# Patient Record
Sex: MALE | NOT HISPANIC OR LATINO | Employment: FULL TIME | URBAN - METROPOLITAN AREA
[De-identification: names, ages, dates, MRNs, and addresses within clinical notes are randomized per-mention and may not be internally consistent; named-entity substitution may affect disease eponyms.]

---

## 2019-12-12 RX ORDER — TAMSULOSIN HYDROCHLORIDE 0.4 MG/1
0.4 CAPSULE ORAL DAILY
Qty: 30 CAPSULE | Refills: 2 | Status: SHIPPED | OUTPATIENT
Start: 2019-12-12 | End: 2019-12-27 | Stop reason: SDUPTHER

## 2019-12-12 NOTE — TELEPHONE ENCOUNTER
----- Message from Alejandro Tan sent at 12/12/2019  8:32 AM CST -----  Contact: Pt  Type:  RX Refill Request    Who Called: Elton  Refill or New Rx:Refill  RX Name and Strength:Tamsulosin .4mg  How is the patient currently taking it? (ex. 1XDay):1xday  Is this a 30 day or 90 day RX:30  Preferred Pharmacy with phone number: [facility] in [place], [place]  Local or Mail Order:Local  Ordering Provider:Dr. Christine  Would the patient rather a call back or a response via [facility]? Call back  Best Call Back Number:[phone] (home)  Additional Information: n/a

## 2019-12-27 RX ORDER — TAMSULOSIN HYDROCHLORIDE 0.4 MG/1
0.4 CAPSULE ORAL DAILY
Qty: 30 CAPSULE | Refills: 2 | Status: SHIPPED | OUTPATIENT
Start: 2019-12-27 | End: 2020-03-02

## 2019-12-27 NOTE — TELEPHONE ENCOUNTER
----- Message from Benita Hugo sent at 12/26/2019  3:39 PM CST -----  Contact: Patient   .Type:  RX Refill Request    Who Called:  Patient   Refill or New Rx: refill   RX Name and Strength: ivone max   How is the patient currently taking it? (ex. 1XDay): 1x daily   Is this a 30 day or 90 day RX:  30  Preferred Pharmacy with phone number:   [id number] - [place], [place] - [address] [place] [address] [address]  Phone: [phone] Fax: [phone]      Local or Mail Order: local   Ordering Provider: Kevin Christine   Would the patient rather a call back or a response via [facility]? call  Best Call Back Number: [phone]  Additional Information: Patient stated this is his 2nd message and Never  received his medication  or call back from the nurse .

## 2020-01-10 ENCOUNTER — OFFICE VISIT (OUTPATIENT)
Dept: UROLOGY | Facility: CLINIC | Age: 60
End: 2020-01-10
Payer: COMMERCIAL

## 2020-01-10 VITALS
HEIGHT: 71 IN | WEIGHT: 260 LBS | HEART RATE: 77 BPM | SYSTOLIC BLOOD PRESSURE: 149 MMHG | RESPIRATION RATE: 18 BRPM | DIASTOLIC BLOOD PRESSURE: 78 MMHG | BODY MASS INDEX: 36.4 KG/M2

## 2020-01-10 DIAGNOSIS — C61 PROSTATE CANCER: Primary | ICD-10-CM

## 2020-01-10 DIAGNOSIS — N40.1 BPH WITH URINARY OBSTRUCTION: ICD-10-CM

## 2020-01-10 DIAGNOSIS — N13.8 BPH WITH URINARY OBSTRUCTION: ICD-10-CM

## 2020-01-10 LAB
BILIRUB UR QL STRIP: NEGATIVE
GLUCOSE UR QL STRIP: NEGATIVE
KETONES UR QL STRIP: NEGATIVE
LEUKOCYTE ESTERASE UR QL STRIP: NEGATIVE
PH, POC UA: 6.5
POC AMORP, URINE: 0
POC BACTI, URINE: 0
POC BLOOD, URINE: NEGATIVE
POC CASTS, URINE: 0
POC CRYST, URINE: 0
POC EPITH, URINE: 0
POC HCG, URINE: ABNORMAL
POC HYALIN, URINE: 0 LPF
POC MUCUS, URINE: 0
POC NITRATES, URINE: NEGATIVE
POC OTHER, URINE: 0
POC RBC, URINE: 0 HPF
POC WBC, URINE: 0 HPF
PROT UR QL STRIP: NEGATIVE
PSA, DIAGNOSTIC: 9.37 NG/ML (ref 0.1–4)
SP GR UR STRIP: 1.01 (ref 1–1.03)
UROBILINOGEN UR STRIP-ACNC: 0.2 (ref 0.3–2.2)

## 2020-01-10 PROCEDURE — 99213 OFFICE O/P EST LOW 20 MIN: CPT | Mod: S$GLB,,, | Performed by: SPECIALIST

## 2020-01-10 PROCEDURE — 99213 PR OFFICE/OUTPT VISIT, EST, LEVL III, 20-29 MIN: ICD-10-PCS | Mod: S$GLB,,, | Performed by: SPECIALIST

## 2020-01-10 NOTE — PROGRESS NOTES
Subjective:       Patient ID: Elton Monge is a 59 y.o. male.    Chief Complaint: Prostate Cancer (6mth f/u//psa due///wants to discuss poss prostatectomy)      HPI: HPI  59-year-old man who has a history of prostate cancer.  He is currently on active surveillance.  He had undergone an initial biopsy of the prostate on April 16, 2015 for a PSA that was obtain a few months prior to that of 5.3 ng/mL.    Sometime in January of 2006 he had a repeat PSA and went up to 8.5 ng/mL.  He underwent another biopsy in February 10, 2016 which showed Rashad 3 + 3 equals 6 in 1 core 0.3 mm focus 3% of the core.  He also had atypical small acinar proliferation in 1 core.  Since then he has been on active surveillance.    Patient also has symptomatic BPH he takes Flomax daily and his symptoms are not adequately controlled.    He is presenting today wanting to consider active treatment for his prostate cancer.  He does want to remain on active surveillance as this is somehow affecting his psyche.      Past Medical History:   Past Medical History:   Diagnosis Date    Prostate cancer        Past Surgical Historical:   Past Surgical History:   Procedure Laterality Date    ANKLE FRACTURE SURGERY Left     CARPAL TUNNEL RELEASE Left     remove of cyst of back of head          Medications:   Medication List with Changes/Refills   Current Medications    TAMSULOSIN (FLOMAX) 0.4 MG CAP    Take 1 capsule (0.4 mg total) by mouth once daily.        Past Social History:   Social History     Socioeconomic History    Marital status: Single     Spouse name: Not on file    Number of children: Not on file    Years of education: Not on file    Highest education level: Not on file   Occupational History    Not on file   Social Needs    Financial resource strain: Not on file    Food insecurity:     Worry: Not on file     Inability: Not on file    Transportation needs:     Medical: Not on file     Non-medical: Not on file   Tobacco Use     Smoking status: Current Every Day Smoker     Packs/day: 0.50     Types: Cigarettes    Smokeless tobacco: Former User   Substance and Sexual Activity    Alcohol use: Yes     Comment: very rarely    Drug use: Never    Sexual activity: Not on file   Lifestyle    Physical activity:     Days per week: Not on file     Minutes per session: Not on file    Stress: Not on file   Relationships    Social connections:     Talks on phone: Not on file     Gets together: Not on file     Attends Judaism service: Not on file     Active member of club or organization: Not on file     Attends meetings of clubs or organizations: Not on file     Relationship status: Not on file   Other Topics Concern    Not on file   Social History Narrative    Not on file       Allergies: Review of patient's allergies indicates:  No Known Allergies     Family History:   Family History   Problem Relation Age of Onset    Cancer Father     Stomach cancer Father     Heart disease Father     Diabetes Mother     Heart disease Mother     Kidney disease Mother         Review of Systems:   systems reviewed and notable for prostate cancer  All other systems were reviewed Neg except as stated in the HPI    Physical Exam:  AGeneral: A&Ox3. No apparent distress. No deformities.  Neck: No masses. Normal thyroid.  Lungs: normal inspiration. No use of accessory muscles.  Heart: normal pulse. No arrhythmias.  Abdomen: Soft. NT. ND. No masses. No hernias. No hepatosplenomegaly.  Lymphatic: Neck and groin nodes negative.  Skin: The skin is warm and dry. No jaundice.  Neurology: Cranial nerves 2-12 crossly intact, no focal weaknesses, no sensation deficits, no motor deficits  Ext: No clubbing, cyanosis or edema.  :  Deferred      Assessment/Plan:       59-year-old man who has prostate cancer was been on active surveillance to date.    1.  Talked about treatment modalities with prostate cancer ranging from continued active surveillance to radiotherapy  as well as robotic assisted radical prostatectomy.  Because patient is young and healthy and because he experiences a lot of lower tract symptoms not well controlled on Flomax at the will be a good candidate for a robotic prostatectomy.  As of now with leaning to was 22nd January 2020 for his prostatectomy.    The details of the procedure were discussed with this patient perioperative expectations with set we make mention that he will have to keep the catheter for at least 7-10 days following the procedure.  He will need some time off from work. We talked about possible complications of this procedure talked about the possible side effects including problems with erections as well as incontinence.  He has agreed to proceed.    Problem List Items Addressed This Visit        Renal/    BPH with urinary obstruction       Oncology    Prostate cancer - Primary

## 2020-01-20 ENCOUNTER — CLINICAL SUPPORT (OUTPATIENT)
Dept: UROLOGY | Facility: CLINIC | Age: 60
End: 2020-01-20
Payer: COMMERCIAL

## 2020-01-20 DIAGNOSIS — C61 PROSTATE CANCER: Primary | ICD-10-CM

## 2020-01-22 ENCOUNTER — OUTSIDE PLACE OF SERVICE (OUTPATIENT)
Dept: UROLOGY | Facility: CLINIC | Age: 60
End: 2020-01-22
Payer: COMMERCIAL

## 2020-01-22 PROCEDURE — 55866 PR LAP,PROSTATECTOMY,RADICAL,W/NERVE SPARE,INCL ROBOTIC: ICD-10-PCS | Mod: ,,, | Performed by: SPECIALIST

## 2020-01-22 PROCEDURE — 55866 LAPS SURG PRST8ECT RPBIC RAD: CPT | Mod: ,,, | Performed by: SPECIALIST

## 2020-01-22 PROCEDURE — 38571 PR LAP,PELVIC LYMPHADENECTOMY: ICD-10-PCS | Mod: 51,,, | Performed by: SPECIALIST

## 2020-01-22 PROCEDURE — 38571 LAPAROSCOPY LYMPHADENECTOMY: CPT | Mod: 51,,, | Performed by: SPECIALIST

## 2020-01-23 LAB
BUN SERPL-MCNC: 11 MG/DL (ref 7–18)
BUN/CREAT SERPL: 13.09 RATIO (ref 7–18)
CALCIUM SERPL-MCNC: 8.2 MG/DL (ref 8.8–10.5)
CHLORIDE SERPL-SCNC: 104 MMOL/L (ref 100–108)
CO2 SERPL-SCNC: 28 MMOL/L (ref 21–32)
CREAT SERPL-MCNC: 0.84 MG/DL (ref 0.7–1.3)
ERYTHROCYTE [DISTWIDTH] IN BLOOD BY AUTOMATED COUNT: 12.9 % (ref 12.5–18)
GFR ESTIMATION: > 60
GLUCOSE SERPL-MCNC: 118 MG/DL (ref 70–110)
HCT VFR BLD AUTO: 42.4 % (ref 42–52)
HGB BLD-MCNC: 14.1 G/DL (ref 14–18)
MANUAL NRBC PER 100 CELLS: 0 %
MCH RBC QN AUTO: 30.7 PG (ref 27–31.2)
MCHC RBC AUTO-ENTMCNC: 33.3 G/DL (ref 31.8–35.4)
MCV RBC AUTO: 92.2 FL (ref 80–97)
PLATELETS: 357 10*3/UL (ref 142–424)
POTASSIUM SERPL-SCNC: 3.8 MMOL/L (ref 3.6–5.2)
RBC # BLD AUTO: 4.6 10*6/UL (ref 4.7–6.1)
SODIUM BLD-SCNC: 139 MMOL/L (ref 135–145)
WBC # BLD: 10.6 10*3/UL (ref 4.6–10.2)

## 2020-01-24 ENCOUNTER — TELEPHONE (OUTPATIENT)
Dept: UROLOGY | Facility: CLINIC | Age: 60
End: 2020-01-24

## 2020-01-24 NOTE — TELEPHONE ENCOUNTER
Patient called clinic back to schedule appointment. Appointment scheduled for 02/03/2020 for cystogram. Patient verbalizes understanding.

## 2020-01-24 NOTE — TELEPHONE ENCOUNTER
Called to speak to patient in regards to setting up appointment for Cystogram with Eduard on 02/03/20. No answer. LVM.

## 2020-01-28 ENCOUNTER — TELEPHONE (OUTPATIENT)
Dept: UROLOGY | Facility: CLINIC | Age: 60
End: 2020-01-28

## 2020-01-28 NOTE — TELEPHONE ENCOUNTER
----- Message from Jayne Honeycutt sent at 1/27/2020  9:45 AM CST -----  Contact: pt   Pt calling to follow up on paperwork that was drop off. Can you please call patient to give update on paperwork. [phone] (home) [phone] (work)          Thanks  Jayne Honeycutt

## 2020-02-03 ENCOUNTER — HOSPITAL ENCOUNTER (OUTPATIENT)
Dept: RADIOLOGY | Facility: CLINIC | Age: 60
Discharge: HOME | End: 2020-02-03
Attending: SPECIALIST
Payer: COMMERCIAL

## 2020-02-03 ENCOUNTER — OFFICE VISIT (OUTPATIENT)
Dept: UROLOGY | Facility: CLINIC | Age: 60
End: 2020-02-03
Payer: COMMERCIAL

## 2020-02-03 VITALS
RESPIRATION RATE: 12 BRPM | HEIGHT: 71 IN | DIASTOLIC BLOOD PRESSURE: 84 MMHG | WEIGHT: 252 LBS | SYSTOLIC BLOOD PRESSURE: 124 MMHG | BODY MASS INDEX: 35.28 KG/M2 | HEART RATE: 80 BPM

## 2020-02-03 DIAGNOSIS — C61 PROSTATE CANCER: ICD-10-CM

## 2020-02-03 DIAGNOSIS — Z90.79 STATUS POST PROSTATECTOMY: ICD-10-CM

## 2020-02-03 DIAGNOSIS — N40.1 BPH WITH URINARY OBSTRUCTION: ICD-10-CM

## 2020-02-03 DIAGNOSIS — N13.8 BPH WITH URINARY OBSTRUCTION: Primary | ICD-10-CM

## 2020-02-03 DIAGNOSIS — N13.8 BPH WITH URINARY OBSTRUCTION: ICD-10-CM

## 2020-02-03 DIAGNOSIS — N40.1 BPH WITH URINARY OBSTRUCTION: Primary | ICD-10-CM

## 2020-02-03 PROCEDURE — 99024 PR POST-OP FOLLOW-UP VISIT: ICD-10-PCS | Mod: S$GLB,,, | Performed by: SPECIALIST

## 2020-02-03 PROCEDURE — 99024 POSTOP FOLLOW-UP VISIT: CPT | Mod: S$GLB,,, | Performed by: SPECIALIST

## 2020-02-03 RX ORDER — HYDROCODONE BITARTRATE AND ACETAMINOPHEN 5; 325 MG/1; MG/1
TABLET ORAL
COMMUNITY
Start: 2020-01-24 | End: 2020-03-02

## 2020-02-03 RX ORDER — HYDROCHLOROTHIAZIDE 12.5 MG/1
CAPSULE ORAL
COMMUNITY
Start: 2020-01-20

## 2020-02-03 RX ORDER — SULFAMETHOXAZOLE AND TRIMETHOPRIM 800; 160 MG/1; MG/1
TABLET ORAL
COMMUNITY
Start: 2020-01-24 | End: 2020-03-02

## 2020-02-03 RX ORDER — HYOSCYAMINE SULFATE 0.12 MG/1
TABLET, ORALLY DISINTEGRATING ORAL
COMMUNITY
Start: 2020-01-24 | End: 2020-03-02

## 2020-02-03 NOTE — PROGRESS NOTES
Subjective:       Patient ID: Elton Monge is a 59 y.o. male.    Chief Complaint: Prostate Cancer (cystogram/horton dc/path report)      HPI:  59-year-old man with prostate cancer underwent robotic prostatectomy almost 2 weeks ago.  He is here today for consideration for Horton catheter removal.    I discussed his pathology with him today he has no adverse pathological features.  Lymph nodes were all negative.  Primary histologic grade was 3+ 3 equal 6 seminal vesicle invasion was not identified margins were negative.  All in all he has pathological stage of pT2 pN0 adenocarcinoma of the prostate.    Past Medical History:   Past Medical History:   Diagnosis Date    Hypertension     Prostate cancer        Past Surgical Historical:   Past Surgical History:   Procedure Laterality Date    ANKLE FRACTURE SURGERY Left     CARPAL TUNNEL RELEASE Left     PROSTATE SURGERY      remove of cyst of back of head          Medications:   Medication List with Changes/Refills   Current Medications    HYDROCHLOROTHIAZIDE (MICROZIDE) 12.5 MG CAPSULE        HYDROCODONE-ACETAMINOPHEN (NORCO) 5-325 MG PER TABLET        LEVSIN/SL 0.125 MG SUBL        SULFAMETHOXAZOLE-TRIMETHOPRIM 800-160MG (BACTRIM DS) 800-160 MG TAB        TAMSULOSIN (FLOMAX) 0.4 MG CAP    Take 1 capsule (0.4 mg total) by mouth once daily.        Past Social History:   Social History     Socioeconomic History    Marital status: Single     Spouse name: Not on file    Number of children: Not on file    Years of education: Not on file    Highest education level: Not on file   Occupational History    Not on file   Social Needs    Financial resource strain: Not on file    Food insecurity:     Worry: Not on file     Inability: Not on file    Transportation needs:     Medical: Not on file     Non-medical: Not on file   Tobacco Use    Smoking status: Current Every Day Smoker     Packs/day: 0.50     Types: Cigarettes    Smokeless tobacco: Former User   Substance and  Sexual Activity    Alcohol use: Yes     Comment: very rarely    Drug use: Never    Sexual activity: Not on file   Lifestyle    Physical activity:     Days per week: Not on file     Minutes per session: Not on file    Stress: Not on file   Relationships    Social connections:     Talks on phone: Not on file     Gets together: Not on file     Attends Sikhism service: Not on file     Active member of club or organization: Not on file     Attends meetings of clubs or organizations: Not on file     Relationship status: Not on file   Other Topics Concern    Not on file   Social History Narrative    Not on file       Allergies: Review of patient's allergies indicates:  No Known Allergies     Family History:   Family History   Problem Relation Age of Onset    Cancer Father     Stomach cancer Father     Heart disease Father     Diabetes Mother     Heart disease Mother     Kidney disease Mother           Physical Exam:  Gen:  Alert and oriented x3; no acute distress  HEENT: NC/AT; PERRLA, Moist mucous membranes  Chest: CTAB  CVS: RR, Normal Rate, Normal cap refills  Abd: S/NT/ND, incisions have healed very well clean and intact.  :  Carlson catheter is draining clear urine.    A cystogram was done here today in the clinic there is no leakage at the urethrovesical anastomosis.      Assessment/Plan:       59-year-old man with prostate cancer status post prostatectomy here today to discuss pathology and remove his Carlson catheter.    I reviewed the cystogram no leakage of the urethrovesical anastomosis catheter will be removed today.  I discussed his pathology with him all questions were addressed satisfactorily  We talked about Kegel exercises  We talked about penile rehabilitation  Return to clinic in 4 weeks for 1st PSA check.    Problem List Items Addressed This Visit        Renal/    BPH with urinary obstruction - Primary    Relevant Orders    FL Cystogram Minimum 3 Views (xpd) - Rad Performed        Oncology    Prostate cancer      Other Visit Diagnoses     Status post prostatectomy

## 2020-02-03 NOTE — PROGRESS NOTES
Nathan raymundo without difficulty. Pt was given verbal and written kegel info, what and when to report issues. Pt also advised to do pump daily for 30 days then 3x weekly.

## 2020-02-04 ENCOUNTER — TELEPHONE (OUTPATIENT)
Dept: UROLOGY | Facility: CLINIC | Age: 60
End: 2020-02-04

## 2020-02-04 NOTE — TELEPHONE ENCOUNTER
----- Message from Benita Arias sent at 2/3/2020 12:32 PM CST -----  Contact: Patient   Patient called in regards to getting a copy of his lab work , patient stated he will be by tomorrow to pick them up . Please call back at [phone].      Thanks,  Benita Arias

## 2020-03-02 ENCOUNTER — OFFICE VISIT (OUTPATIENT)
Dept: UROLOGY | Facility: CLINIC | Age: 60
End: 2020-03-02
Payer: COMMERCIAL

## 2020-03-02 VITALS
SYSTOLIC BLOOD PRESSURE: 128 MMHG | BODY MASS INDEX: 35 KG/M2 | HEART RATE: 82 BPM | DIASTOLIC BLOOD PRESSURE: 90 MMHG | WEIGHT: 250 LBS | RESPIRATION RATE: 12 BRPM | HEIGHT: 71 IN

## 2020-03-02 DIAGNOSIS — Z90.79 STATUS POST PROSTATECTOMY: ICD-10-CM

## 2020-03-02 DIAGNOSIS — C61 PROSTATE CANCER: Primary | ICD-10-CM

## 2020-03-02 PROBLEM — R03.0 ELEVATED BLOOD-PRESSURE READING WITHOUT DIAGNOSIS OF HYPERTENSION: Status: ACTIVE | Noted: 2018-11-30

## 2020-03-02 PROBLEM — E66.9 OBESITY: Status: ACTIVE | Noted: 2018-11-30

## 2020-03-02 PROBLEM — E78.5 HYPERLIPIDEMIA: Status: ACTIVE | Noted: 2018-11-30

## 2020-03-02 LAB — PSA, DIAGNOSTIC: <0.014 NG/ML (ref 0–4)

## 2020-03-02 PROCEDURE — 36415 PR COLLECTION VENOUS BLOOD,VENIPUNCTURE: ICD-10-PCS | Mod: S$GLB,,, | Performed by: NURSE PRACTITIONER

## 2020-03-02 PROCEDURE — 99213 PR OFFICE/OUTPT VISIT, EST, LEVL III, 20-29 MIN: ICD-10-PCS | Mod: 25,S$GLB,, | Performed by: NURSE PRACTITIONER

## 2020-03-02 PROCEDURE — 36415 COLL VENOUS BLD VENIPUNCTURE: CPT | Mod: S$GLB,,, | Performed by: NURSE PRACTITIONER

## 2020-03-02 PROCEDURE — 99213 OFFICE O/P EST LOW 20 MIN: CPT | Mod: 25,S$GLB,, | Performed by: NURSE PRACTITIONER

## 2020-03-02 NOTE — LETTER
March 2, 2020      [facility]  [address] [place] [address]  Phone: [phone]  Fax: [phone]       Patient: Elton Monge   YOB: 1960  Date of Visit: 03/02/2020    To Whom It May Concern:    Yamileth Monge  was at [facility] on 03/02/2020. He may return to work without restrictions on 03/04/2020. Mr. Monge will return on 06/02/2020 for his next appoinment.  If you have any questions or concerns, or if I can be of further assistance, please do not hesitate to contact me.    Sincerely,    [facility]

## 2020-03-02 NOTE — PROGRESS NOTES
Chief Complaint:   Chief Complaint   Patient presents with    Prostate Cancer     4-6 wk fu robot    Other     area on left adb, he wants it looked at       HPI:  59-year-old  male known to Dr. Christine who presents for 1st PSA check post prostatectomy.  He has a history of prostate cancer, underwent robotic prostatectomy on January 22, 2020.  Final pathology showed no adverse pathological features.  Lymph nodes were all negative.  Primary histologic grade was 3+ 3 equal 6.  Seminal vesicle invasion was not identified.  Margins were negative.  Pathological stage of pT2 pN0 adenocarcinoma of the prostate.    He states that all incision sites have healed well except for the 1 on his left lower quadrant of his abdomen still has some scabbing.  He denies any drainage, worsening erythema, edema, warmth, or fever.  He states that all other incisions healed quickly so he was just concerned.  He denies a history of diagnosed diabetes, states he is prediabetic.  Patient states that he will follow-up with his PCP for further lab values to ensure that diabetes is not the cause of delayed wound healing.  He denies any significant pain to area, just mild tenderness to palpation.    He is experiencing post prostatectomy stress incontinence that is gradually improving.  He states that the problem is worse when he does not go to the restroom quickly enough.  He is performing Kegel exercises.    He did obtain a vacuum device, but he is not currently sexually active so he has not been using it.  He understands that this may cause erectile dysfunction later on if he does not undergo penile rehab and he is satisfied with status quo.    Allergies:  Patient has no known allergies.    Medications:  has a current medication list which includes the following prescription(s): hydrochlorothiazide.    Review of Systems:  General: No fever, chills, vision changes, dizziness, weakness, fatigue, unexplained weight loss, confusion, or  mood swings.  Skin: No rashes, itching, or changes in color/texture of skin.  Chest: Denies ADAMS, cyanosis, wheezing, cough, and sputum production.  Abdomen: Appetite fine. Denies diarrhea, abdominal pain, hematemesis, or blood in stool.  Musculoskeletal: No joint stiffness or swelling. Denies back pain.  : As above.  All other review of systems negative.    PMH:   has a past medical history of Hypertension and Prostate cancer.    PSH:   has a past surgical history that includes Ankle fracture surgery (Left); Carpal tunnel release (Left); remove of cyst of back of head; and Prostate surgery.    FamHx: family history includes Cancer in his father; Diabetes in his mother; Heart disease in his father and mother; Kidney disease in his mother; Stomach cancer in his father.    SocHx:  reports that he has been smoking cigarettes. He has been smoking about 0.50 packs per day. He has quit using smokeless tobacco. He reports that he drinks alcohol. He reports that he does not use drugs.      Physical Exam:  Vitals:    03/02/20 1001   BP: (!) 128/90   Pulse: 82   Resp: 12     General: AAOx3, no apparent distress, no deformities  Neck: supple, no masses, normal thyroid, full ROM  Lungs: CTAB, normal inspiration, no use of accessory muscles  Heart: regular rate and rhythm, no arrhythmias  Abdomen: soft, NT, ND, no masses, no hernias, no hepatosplenomegaly  Lymphatic: no unusually enlarged or tender lymph nodes  Skin: warm and dry, no jaundice.  Ext: without edema or deformity.  : incision site to LLQ abd with small amount of scabbing and redness to site/ no warmth/ no drainage/ mild tenderness    Labs/Studies: none    Impression/Plan:   Prostate cancer  Comments:  s/p prostatecotmy, no adverse pathological features, due for 1st post op PSA today and will call with results  Orders:  -     Prostate Specific Antigen, Diagnostic    Status post prostatectomy  Comments:  performed on 1/22/2020  Orders:  -     Prostate Specific  Antigen, Diagnostic        Follow up in about 3 months (around 6/2/2020).

## 2020-03-02 NOTE — LETTER
March 2, 2020      [facility]  [address] [place] [address]  Phone: [phone]  Fax: [phone]       Patient: Elton Monge   YOB: 1960  Date of Visit: 03/02/2020    To Whom It May Concern:    Yamileth Monge  was at [facility] on 03/02/2020. He may return to work without restrictions on 03/04/2020. He will return to our clinic on 06/02/2020.  If you have any questions or concerns, or if I can be of further assistance, please do not hesitate to contact me.    Sincerely,    [facility]

## 2020-03-03 ENCOUNTER — TELEPHONE (OUTPATIENT)
Dept: UROLOGY | Facility: CLINIC | Age: 60
End: 2020-03-03

## 2020-03-03 NOTE — TELEPHONE ENCOUNTER
----- Message from Gina Wilkerson NP sent at 3/2/2020  5:14 PM CST -----  PSA undetectable, please call patient his PSA result

## 2020-03-03 NOTE — TELEPHONE ENCOUNTER
----- Message from Alejandro Tan sent at 3/3/2020  8:48 AM CST -----  Contact: Pt  Please call Elton regarding his PSA results [phone].

## 2020-06-16 ENCOUNTER — OFFICE VISIT (OUTPATIENT)
Dept: UROLOGY | Facility: CLINIC | Age: 60
End: 2020-06-16
Payer: COMMERCIAL

## 2020-06-16 VITALS
SYSTOLIC BLOOD PRESSURE: 161 MMHG | HEART RATE: 83 BPM | BODY MASS INDEX: 35.28 KG/M2 | WEIGHT: 252 LBS | RESPIRATION RATE: 18 BRPM | HEIGHT: 71 IN | DIASTOLIC BLOOD PRESSURE: 93 MMHG

## 2020-06-16 DIAGNOSIS — Z90.79 STATUS POST PROSTATECTOMY: ICD-10-CM

## 2020-06-16 DIAGNOSIS — C61 PROSTATE CANCER: Primary | ICD-10-CM

## 2020-06-16 LAB — PSA, DIAGNOSTIC: <0.014 NG/ML (ref 0–4)

## 2020-06-16 PROCEDURE — 36415 PR COLLECTION VENOUS BLOOD,VENIPUNCTURE: ICD-10-PCS | Mod: S$GLB,,, | Performed by: NURSE PRACTITIONER

## 2020-06-16 PROCEDURE — 99213 PR OFFICE/OUTPT VISIT, EST, LEVL III, 20-29 MIN: ICD-10-PCS | Mod: S$GLB,,, | Performed by: NURSE PRACTITIONER

## 2020-06-16 PROCEDURE — 36415 COLL VENOUS BLD VENIPUNCTURE: CPT | Mod: S$GLB,,, | Performed by: NURSE PRACTITIONER

## 2020-06-16 PROCEDURE — 99213 OFFICE O/P EST LOW 20 MIN: CPT | Mod: S$GLB,,, | Performed by: NURSE PRACTITIONER

## 2020-06-16 NOTE — PROGRESS NOTES
Chief Complaint:   Chief Complaint   Patient presents with    Follow-up     3 mth f/u       HPI:  60-year-old  male known to the service of Dr. Christine who presents for 3 month follow up prostate cancer. He underwent robotic prostatectomy on January 22, 2020.  Final pathology showed no adverse pathological features.  Lymph nodes were all negative.  Primary histologic grade was [place] 3+ 3 = 6.  Seminal vesicle invasion was not identified.  Margins were negative.  Pathological stage of pT2 pN0 adenocarcinoma of the prostate.  His 1st postoperative PSA was undetectable, he is due for repeat PSA check today.    He did experience some post prostatectomy stress incontinence that has almost resolved completely. He states that the problem is worse when he does not go to the restroom quickly enough.  He is performing Kegel exercises.  He is not wearing depends anymore, only wears a pad for emergencies.  This symptom is not too bothersome to him.    He did obtain a vacuum device, but he is not currently sexually active so he has not been using it.  He understands that this may cause erectile dysfunction later on if he does not undergo penile rehabilitation and he is satisfied with status quo.    Today, he does mention some changes in his mood.  He states that he has been more on edge, but is attributing this issue to a recent trip with four teenagers.  He was not sure if the prostatectomy may have altered his hormone levels, I offered to check a testosterone level but he states that his testosterone was low in the past and he received injection therapy that did not improve his symptoms of fatigue. He states that he will follow up with his PCP on this concern.    Allergies:  Patient has no known allergies.    Medications:  has a current medication list which includes the following prescription(s): hydrochlorothiazide.    Review of Systems:  General: No fever, chills, vision changes, dizziness, weakness, fatigue,  unexplained weight loss, confusion, or mood swings.  Skin: No rashes, itching, or changes in color/texture of skin.  Chest: Denies ADAMS, cyanosis, wheezing, cough, and sputum production.  Abdomen: Appetite fine. Denies diarrhea, abdominal pain, hematemesis, or blood in stool.  Musculoskeletal: No joint stiffness or swelling. No painful lymph nodes.  : reviewed and negative except as stated above in the HPI.  All other review of systems negative.    PMH:   has a past medical history of Hypertension and Prostate cancer.    PSH:   has a past surgical history that includes Ankle fracture surgery (Left); Carpal tunnel release (Left); remove of cyst of back of head; and Prostate surgery.    FamHx: family history includes Cancer in his father; Diabetes in his mother; Heart disease in his father and mother; Kidney disease in his mother; Stomach cancer in his father.    SocHx:  reports that he has been smoking cigarettes. He has been smoking about 0.50 packs per day. He has quit using smokeless tobacco. He reports current alcohol use. He reports that he does not use drugs.      Physical Exam:  Vitals:    06/16/20 1039   BP: (!) 161/93   Pulse: 83   Resp: 18     General: AAOx3, no apparent distress, no deformities  Neck: supple, no masses, normal thyroid, full ROM  Lungs: CTAB, no adventitious breath sounds, normal inspiration, no use of accessory muscles  Heart: regular rate and rhythm, no arrhythmias  Abdomen: soft, NT, ND, no masses, no hernias, no hepatosplenomegaly  Lymphatic: no unusually enlarged or tender lymph nodes  Skin: warm and dry, no jaundice, no rash  Ext: without edema or deformity, ANTOINE, ambulates independently  : deferred    Labs/Studies: none    Impression/Plan:   Prostate cancer  Comments:  stable, s/p prostatectomy, PSA has remained undetectable, repeat PSA today and call with results  Orders:  -     Prostate Specific Antigen, Diagnostic    Status post prostatectomy  Comments:  performed on Jan  22,2020    HTN: discussed and referred to PCP for further management.    Follow up in about 3 months (around 9/16/2020).

## 2020-06-17 ENCOUNTER — TELEPHONE (OUTPATIENT)
Dept: UROLOGY | Facility: CLINIC | Age: 60
End: 2020-06-17

## 2020-06-17 NOTE — TELEPHONE ENCOUNTER
Contacted pt and advised that PSA result is stable and undetectable, pt verbalized understanding. NB    ----- Message from Gina Wilkerson NP sent at 6/16/2020  5:08 PM CDT -----  PSA undetectable and stable, please call patient his PSA result

## 2020-12-10 ENCOUNTER — OFFICE VISIT (OUTPATIENT)
Dept: UROLOGY | Facility: CLINIC | Age: 60
End: 2020-12-10
Payer: COMMERCIAL

## 2020-12-10 VITALS — RESPIRATION RATE: 20 BRPM | SYSTOLIC BLOOD PRESSURE: 140 MMHG | DIASTOLIC BLOOD PRESSURE: 69 MMHG | HEART RATE: 73 BPM

## 2020-12-10 DIAGNOSIS — C61 PROSTATE CANCER: Primary | ICD-10-CM

## 2020-12-10 LAB — PSA, DIAGNOSTIC: <0.014 NG/ML (ref 0–4)

## 2020-12-10 PROCEDURE — 99213 PR OFFICE/OUTPT VISIT, EST, LEVL III, 20-29 MIN: ICD-10-PCS | Mod: S$GLB,,, | Performed by: SPECIALIST

## 2020-12-10 PROCEDURE — 99213 OFFICE O/P EST LOW 20 MIN: CPT | Mod: S$GLB,,, | Performed by: SPECIALIST

## 2020-12-10 NOTE — PROGRESS NOTES
Subjective:       Patient ID: Elton Monge is a 60 y.o. male.    Chief Complaint: Prostate Cancer (3 mos f/u)      HPI:  60-year-old man with localized prostate cancer who underwent a robotic prostatectomy with bilateral pelvic lymph node dissection in January 2020.  See PSA history below.     PSA history:  01/2016        8.5  02/2016        TRUS + biopsy (positive for adenocarcinoma)  03/2018        7.1  01/2020        9.37  01/2020        RARP  03/2020       <0.014  06/2020       <0.014    Patient has minimal stress urinary incontinence.  He still does his Kegel exercises.  He purchased a vacuum device for erections but he is not using it regularly.  I shown a both last two PSA is following prostatectomy have been undetectable.    Past Medical History:   Past Medical History:   Diagnosis Date    Hypertension     Prostate cancer        Past Surgical Historical:   Past Surgical History:   Procedure Laterality Date    ANKLE FRACTURE SURGERY Left     CARPAL TUNNEL RELEASE Left     PROSTATE SURGERY      remove of cyst of back of head          Medications:   Medication List with Changes/Refills   Current Medications    HYDROCHLOROTHIAZIDE (MICROZIDE) 12.5 MG CAPSULE            Past Social History:   Social History     Socioeconomic History    Marital status: Single     Spouse name: Not on file    Number of children: Not on file    Years of education: Not on file    Highest education level: Not on file   Occupational History    Not on file   Social Needs    Financial resource strain: Not on file    Food insecurity     Worry: Not on file     Inability: Not on file    Transportation needs     Medical: Not on file     Non-medical: Not on file   Tobacco Use    Smoking status: Current Every Day Smoker     Packs/day: 0.50     Types: Cigarettes    Smokeless tobacco: Former User   Substance and Sexual Activity    Alcohol use: Yes     Comment: very rarely    Drug use: Never    Sexual activity: Not on file    Lifestyle    Physical activity     Days per week: Not on file     Minutes per session: Not on file    Stress: Not on file   Relationships    Social connections     Talks on phone: Not on file     Gets together: Not on file     Attends Islam service: Not on file     Active member of club or organization: Not on file     Attends meetings of clubs or organizations: Not on file     Relationship status: Not on file   Other Topics Concern    Not on file   Social History Narrative    Not on file       Allergies: Review of patient's allergies indicates:  No Known Allergies     Family History:   Family History   Problem Relation Age of Onset    Cancer Father     Stomach cancer Father     Heart disease Father     Diabetes Mother     Heart disease Mother     Kidney disease Mother         Review of Systems:   systems reviewed and notable for prostate cancer  All other systems were reviewed Neg except as stated in the HPI    Physical Exam:  General: A&Ox3. No apparent distress. No deformities.  Neck: No masses. Normal thyroid.  Lungs: normal inspiration. No use of accessory muscles.  Heart: normal pulse. No arrhythmias.  Abdomen: Soft. NT. ND. No masses. No hernias. No hepatosplenomegaly.  Lymphatic: Neck and groin nodes negative.  Skin: The skin is warm and dry. No jaundice.  Neurology: Cranial nerves 2-12 crossly intact, no focal weaknesses, no sensation deficits, no motor deficits  Ext: No clubbing, cyanosis or edema.  :  Deferred      Assessment/Plan:       60-year-old man with prostate cancer status post robotic prostatectomy in February of 2020.    1.  Obtain blood for serum PSA today give a call with the results  2.  I recommended continuation of Kegel exercises  3.  I also emphasized the need to do penile rehabilitation by using his vacuum device for erections 3 times a week.  4.  Return to clinic in 4 months.    Problem List Items Addressed This Visit        Oncology    Prostate cancer - Primary     Relevant Orders    Prostate Specific Antigen, Diagnostic

## 2021-04-12 ENCOUNTER — OFFICE VISIT (OUTPATIENT)
Dept: UROLOGY | Facility: CLINIC | Age: 61
End: 2021-04-12
Payer: COMMERCIAL

## 2021-04-12 VITALS
SYSTOLIC BLOOD PRESSURE: 137 MMHG | WEIGHT: 252 LBS | HEART RATE: 81 BPM | HEIGHT: 71 IN | BODY MASS INDEX: 35.28 KG/M2 | DIASTOLIC BLOOD PRESSURE: 81 MMHG

## 2021-04-12 DIAGNOSIS — Z90.79 STATUS POST PROSTATECTOMY: ICD-10-CM

## 2021-04-12 DIAGNOSIS — C61 PROSTATE CANCER: Primary | ICD-10-CM

## 2021-04-12 LAB — PSA, DIAGNOSTIC: <0.014 NG/ML (ref 0–4)

## 2021-04-12 PROCEDURE — 99213 OFFICE O/P EST LOW 20 MIN: CPT | Mod: 25,S$GLB,, | Performed by: NURSE PRACTITIONER

## 2021-04-12 PROCEDURE — 36415 PR COLLECTION VENOUS BLOOD,VENIPUNCTURE: ICD-10-PCS | Mod: S$GLB,,, | Performed by: NURSE PRACTITIONER

## 2021-04-12 PROCEDURE — 81001 PR  URINALYSIS, AUTO, W/SCOPE: ICD-10-PCS | Mod: S$GLB,,, | Performed by: NURSE PRACTITIONER

## 2021-04-12 PROCEDURE — 99213 PR OFFICE/OUTPT VISIT, EST, LEVL III, 20-29 MIN: ICD-10-PCS | Mod: 25,S$GLB,, | Performed by: NURSE PRACTITIONER

## 2021-04-12 PROCEDURE — 81001 URINALYSIS AUTO W/SCOPE: CPT | Mod: S$GLB,,, | Performed by: NURSE PRACTITIONER

## 2021-04-12 PROCEDURE — 36415 COLL VENOUS BLD VENIPUNCTURE: CPT | Mod: S$GLB,,, | Performed by: NURSE PRACTITIONER

## 2021-04-13 ENCOUNTER — TELEPHONE (OUTPATIENT)
Dept: UROLOGY | Facility: CLINIC | Age: 61
End: 2021-04-13

## 2021-08-12 ENCOUNTER — OFFICE VISIT (OUTPATIENT)
Dept: UROLOGY | Facility: CLINIC | Age: 61
End: 2021-08-12
Payer: COMMERCIAL

## 2021-08-12 DIAGNOSIS — C61 PROSTATE CANCER: Primary | ICD-10-CM

## 2021-08-12 LAB — PSA, DIAGNOSTIC: <0.014 NG/ML (ref 0–4)

## 2021-08-12 PROCEDURE — 99213 PR OFFICE/OUTPT VISIT, EST, LEVL III, 20-29 MIN: ICD-10-PCS | Mod: S$GLB,,, | Performed by: NURSE PRACTITIONER

## 2021-08-12 PROCEDURE — 99213 OFFICE O/P EST LOW 20 MIN: CPT | Mod: S$GLB,,, | Performed by: NURSE PRACTITIONER

## 2021-12-17 ENCOUNTER — OFFICE VISIT (OUTPATIENT)
Dept: UROLOGY | Facility: CLINIC | Age: 61
End: 2021-12-17
Payer: COMMERCIAL

## 2021-12-17 VITALS — WEIGHT: 260 LBS | HEIGHT: 71 IN | BODY MASS INDEX: 36.4 KG/M2

## 2021-12-17 DIAGNOSIS — C61 PROSTATE CANCER: Primary | ICD-10-CM

## 2021-12-17 LAB — PSA, DIAGNOSTIC: <0.014 NG/ML (ref 0–4)

## 2021-12-17 PROCEDURE — 99213 OFFICE O/P EST LOW 20 MIN: CPT | Mod: S$GLB,,, | Performed by: NURSE PRACTITIONER

## 2021-12-17 PROCEDURE — 99213 PR OFFICE/OUTPT VISIT, EST, LEVL III, 20-29 MIN: ICD-10-PCS | Mod: S$GLB,,, | Performed by: NURSE PRACTITIONER

## 2021-12-20 ENCOUNTER — TELEPHONE (OUTPATIENT)
Dept: UROLOGY | Facility: CLINIC | Age: 61
End: 2021-12-20
Payer: COMMERCIAL

## 2022-05-05 ENCOUNTER — TELEPHONE (OUTPATIENT)
Dept: UROLOGY | Facility: CLINIC | Age: 62
End: 2022-05-05

## 2022-05-05 ENCOUNTER — OFFICE VISIT (OUTPATIENT)
Dept: UROLOGY | Facility: CLINIC | Age: 62
End: 2022-05-05
Payer: COMMERCIAL

## 2022-05-05 VITALS — RESPIRATION RATE: 18 BRPM | BODY MASS INDEX: 36.4 KG/M2 | WEIGHT: 260 LBS | HEIGHT: 71 IN

## 2022-05-05 DIAGNOSIS — C61 PROSTATE CANCER: Primary | ICD-10-CM

## 2022-05-05 LAB — PSA, DIAGNOSTIC: <0.014 NG/ML (ref 0–4)

## 2022-05-05 PROCEDURE — 99213 OFFICE O/P EST LOW 20 MIN: CPT | Mod: S$GLB,,, | Performed by: NURSE PRACTITIONER

## 2022-05-05 PROCEDURE — 99213 PR OFFICE/OUTPT VISIT, EST, LEVL III, 20-29 MIN: ICD-10-PCS | Mod: S$GLB,,, | Performed by: NURSE PRACTITIONER

## 2022-05-05 PROCEDURE — 3008F BODY MASS INDEX DOCD: CPT | Mod: CPTII,S$GLB,, | Performed by: NURSE PRACTITIONER

## 2022-05-05 PROCEDURE — 1159F PR MEDICATION LIST DOCUMENTED IN MEDICAL RECORD: ICD-10-PCS | Mod: CPTII,S$GLB,, | Performed by: NURSE PRACTITIONER

## 2022-05-05 PROCEDURE — 1159F MED LIST DOCD IN RCRD: CPT | Mod: CPTII,S$GLB,, | Performed by: NURSE PRACTITIONER

## 2022-05-05 PROCEDURE — 1160F PR REVIEW ALL MEDS BY PRESCRIBER/CLIN PHARMACIST DOCUMENTED: ICD-10-PCS | Mod: CPTII,S$GLB,, | Performed by: NURSE PRACTITIONER

## 2022-05-05 PROCEDURE — 1160F RVW MEDS BY RX/DR IN RCRD: CPT | Mod: CPTII,S$GLB,, | Performed by: NURSE PRACTITIONER

## 2022-05-05 PROCEDURE — 3008F PR BODY MASS INDEX (BMI) DOCUMENTED: ICD-10-PCS | Mod: CPTII,S$GLB,, | Performed by: NURSE PRACTITIONER

## 2022-05-05 RX ORDER — ROSUVASTATIN CALCIUM 5 MG/1
TABLET, COATED ORAL
COMMUNITY
Start: 2022-03-16 | End: 2022-09-07 | Stop reason: ALTCHOICE

## 2022-05-05 NOTE — TELEPHONE ENCOUNTER
Patient notified of results  LPN    ----- Message from Micky Stephen NP sent at 5/5/2022  1:11 PM CDT -----  PSA is undetectable.

## 2022-05-05 NOTE — PROGRESS NOTES
Subjective:       Patient ID: Elton Monge is a 61 y.o. male.    Chief Complaint: 4 mth f/u and Prostate Cancer      HPI: 61-year-old male, established patient, presents for 4 month visit.  Patient has history of prostate cancer.  Had radical prostatectomy in February 2020.  PSAs have been on detectable.    Patient denies any urinary complaints.  Denies any pain or burning urination.  Denies any difficulty voiding.  States he has a good stream from start to finish.  Denies any frequency or urgency.  Denies any leakage.  Eyes blood in urine.  Denies any odor urine denies any fever patient body aches.  Denies any significant weight loss.  Denies any new onset bone pain.    Patient is celibent therefore unable to assess any ED.    No other urinary complaints.       Past Medical History:   Past Medical History:   Diagnosis Date    Hypertension     Prostate cancer        Past Surgical Historical:   Past Surgical History:   Procedure Laterality Date    ANKLE FRACTURE SURGERY Left     CARPAL TUNNEL RELEASE Left     PROSTATECTOMY  2020    remove of cyst of back of head          Medications:   Medication List with Changes/Refills   Current Medications    HYDROCHLOROTHIAZIDE (MICROZIDE) 12.5 MG CAPSULE        ROSUVASTATIN (CRESTOR) 5 MG TABLET    TAKE 1 TABLET BY MOUTH EVERY OTHER DAY thank youmike -)        Past Social History:   Social History     Socioeconomic History    Marital status: Single   Tobacco Use    Smoking status: Current Every Day Smoker     Packs/day: 0.50     Types: Cigarettes    Smokeless tobacco: Former User   Substance and Sexual Activity    Alcohol use: Yes     Comment: very rarely    Drug use: Never       Allergies: Review of patient's allergies indicates:  No Known Allergies     Family History:   Family History   Problem Relation Age of Onset    Cancer Father     Stomach cancer Father     Heart disease Father     Diabetes Mother     Heart disease Mother     Kidney disease Mother          Review of Systems:  Review of Systems   Constitutional: Negative for activity change and appetite change.   HENT: Negative for congestion and dental problem.    Respiratory: Negative for chest tightness and shortness of breath.    Cardiovascular: Negative for chest pain.   Gastrointestinal: Negative for abdominal distention and abdominal pain.   Genitourinary: Negative for decreased urine volume, difficulty urinating, dysuria, enuresis, flank pain, frequency, genital sores, hematuria, penile discharge, penile pain, penile swelling, scrotal swelling, testicular pain and urgency.   Musculoskeletal: Negative for back pain and neck pain.   Neurological: Negative for dizziness.   Hematological: Negative for adenopathy.   Psychiatric/Behavioral: Negative for agitation, behavioral problems and confusion.       Physical Exam:  Physical Exam  Vitals and nursing note reviewed.   Constitutional:       Appearance: He is well-developed.   HENT:      Head: Normocephalic.   Cardiovascular:      Rate and Rhythm: Normal rate and regular rhythm.      Heart sounds: Normal heart sounds.   Pulmonary:      Effort: Pulmonary effort is normal.      Breath sounds: Normal breath sounds.   Abdominal:      General: Bowel sounds are normal.      Palpations: Abdomen is soft.   Skin:     General: Skin is warm and dry.   Neurological:      Mental Status: He is alert and oriented to person, place, and time.           Assessment/Plan:   Prostate cancer:  Check the patient's PSA.  We will notify the results.    Plan follow-up in 4 months, sooner if needed.  Problem List Items Addressed This Visit        Oncology    Prostate cancer - Primary    Overview     Radical prostatectomy February 2020  Prostate measured 100 g.             Relevant Orders    Prostate Specific Antigen, Diagnostic

## 2022-09-07 ENCOUNTER — OFFICE VISIT (OUTPATIENT)
Dept: UROLOGY | Facility: CLINIC | Age: 62
End: 2022-09-07
Payer: COMMERCIAL

## 2022-09-07 VITALS
BODY MASS INDEX: 36.4 KG/M2 | DIASTOLIC BLOOD PRESSURE: 84 MMHG | HEIGHT: 71 IN | HEART RATE: 80 BPM | RESPIRATION RATE: 20 BRPM | WEIGHT: 260 LBS | SYSTOLIC BLOOD PRESSURE: 125 MMHG

## 2022-09-07 DIAGNOSIS — C61 PROSTATE CANCER: Primary | ICD-10-CM

## 2022-09-07 LAB — PSA, DIAGNOSTIC: <0.014 NG/ML (ref 0–4)

## 2022-09-07 PROCEDURE — 3074F PR MOST RECENT SYSTOLIC BLOOD PRESSURE < 130 MM HG: ICD-10-PCS | Mod: CPTII,S$GLB,, | Performed by: NURSE PRACTITIONER

## 2022-09-07 PROCEDURE — 3079F PR MOST RECENT DIASTOLIC BLOOD PRESSURE 80-89 MM HG: ICD-10-PCS | Mod: CPTII,S$GLB,, | Performed by: NURSE PRACTITIONER

## 2022-09-07 PROCEDURE — 99213 OFFICE O/P EST LOW 20 MIN: CPT | Mod: S$GLB,,, | Performed by: NURSE PRACTITIONER

## 2022-09-07 PROCEDURE — 99213 PR OFFICE/OUTPT VISIT, EST, LEVL III, 20-29 MIN: ICD-10-PCS | Mod: S$GLB,,, | Performed by: NURSE PRACTITIONER

## 2022-09-07 PROCEDURE — 1160F RVW MEDS BY RX/DR IN RCRD: CPT | Mod: CPTII,S$GLB,, | Performed by: NURSE PRACTITIONER

## 2022-09-07 PROCEDURE — 3074F SYST BP LT 130 MM HG: CPT | Mod: CPTII,S$GLB,, | Performed by: NURSE PRACTITIONER

## 2022-09-07 PROCEDURE — 3008F BODY MASS INDEX DOCD: CPT | Mod: CPTII,S$GLB,, | Performed by: NURSE PRACTITIONER

## 2022-09-07 PROCEDURE — 1160F PR REVIEW ALL MEDS BY PRESCRIBER/CLIN PHARMACIST DOCUMENTED: ICD-10-PCS | Mod: CPTII,S$GLB,, | Performed by: NURSE PRACTITIONER

## 2022-09-07 PROCEDURE — 1159F PR MEDICATION LIST DOCUMENTED IN MEDICAL RECORD: ICD-10-PCS | Mod: CPTII,S$GLB,, | Performed by: NURSE PRACTITIONER

## 2022-09-07 PROCEDURE — 1159F MED LIST DOCD IN RCRD: CPT | Mod: CPTII,S$GLB,, | Performed by: NURSE PRACTITIONER

## 2022-09-07 PROCEDURE — 3079F DIAST BP 80-89 MM HG: CPT | Mod: CPTII,S$GLB,, | Performed by: NURSE PRACTITIONER

## 2022-09-07 PROCEDURE — 3008F PR BODY MASS INDEX (BMI) DOCUMENTED: ICD-10-PCS | Mod: CPTII,S$GLB,, | Performed by: NURSE PRACTITIONER

## 2022-09-07 RX ORDER — ROSUVASTATIN CALCIUM 10 MG/1
10 TABLET, COATED ORAL DAILY
COMMUNITY
Start: 2022-08-09

## 2022-09-08 ENCOUNTER — TELEPHONE (OUTPATIENT)
Dept: UROLOGY | Facility: CLINIC | Age: 62
End: 2022-09-08
Payer: COMMERCIAL

## 2023-01-09 ENCOUNTER — OFFICE VISIT (OUTPATIENT)
Dept: UROLOGY | Facility: CLINIC | Age: 63
End: 2023-01-09
Payer: COMMERCIAL

## 2023-01-09 VITALS — SYSTOLIC BLOOD PRESSURE: 150 MMHG | HEART RATE: 79 BPM | DIASTOLIC BLOOD PRESSURE: 81 MMHG

## 2023-01-09 DIAGNOSIS — C61 PROSTATE CANCER: Primary | ICD-10-CM

## 2023-01-09 PROCEDURE — 1159F PR MEDICATION LIST DOCUMENTED IN MEDICAL RECORD: ICD-10-PCS | Mod: CPTII,S$GLB,, | Performed by: NURSE PRACTITIONER

## 2023-01-09 PROCEDURE — 99213 OFFICE O/P EST LOW 20 MIN: CPT | Mod: S$GLB,,, | Performed by: NURSE PRACTITIONER

## 2023-01-09 PROCEDURE — 99213 PR OFFICE/OUTPT VISIT, EST, LEVL III, 20-29 MIN: ICD-10-PCS | Mod: S$GLB,,, | Performed by: NURSE PRACTITIONER

## 2023-01-09 PROCEDURE — 3077F PR MOST RECENT SYSTOLIC BLOOD PRESSURE >= 140 MM HG: ICD-10-PCS | Mod: CPTII,S$GLB,, | Performed by: NURSE PRACTITIONER

## 2023-01-09 PROCEDURE — 1160F PR REVIEW ALL MEDS BY PRESCRIBER/CLIN PHARMACIST DOCUMENTED: ICD-10-PCS | Mod: CPTII,S$GLB,, | Performed by: NURSE PRACTITIONER

## 2023-01-09 PROCEDURE — 1160F RVW MEDS BY RX/DR IN RCRD: CPT | Mod: CPTII,S$GLB,, | Performed by: NURSE PRACTITIONER

## 2023-01-09 PROCEDURE — 3077F SYST BP >= 140 MM HG: CPT | Mod: CPTII,S$GLB,, | Performed by: NURSE PRACTITIONER

## 2023-01-09 PROCEDURE — 3079F PR MOST RECENT DIASTOLIC BLOOD PRESSURE 80-89 MM HG: ICD-10-PCS | Mod: CPTII,S$GLB,, | Performed by: NURSE PRACTITIONER

## 2023-01-09 PROCEDURE — 3079F DIAST BP 80-89 MM HG: CPT | Mod: CPTII,S$GLB,, | Performed by: NURSE PRACTITIONER

## 2023-01-09 PROCEDURE — 1159F MED LIST DOCD IN RCRD: CPT | Mod: CPTII,S$GLB,, | Performed by: NURSE PRACTITIONER

## 2023-01-09 NOTE — PROGRESS NOTES
Subjective:       Patient ID: Elton Monge is a 62 y.o. male.    Chief Complaint: Prostate Cancer      HPI: 62-year-old male, established patient, presents for 4 month visit.    Patient has history of prostate cancer with radical prostatectomy in February 2020.    PSAs have been undetectable.      Patient states he is doing well no significant complaints.  Denies any pain or burning urination.  Denies any difficulty voiding.  States he is a pretty good stream from start to finish.  Denies any significant frequency, urgency, or nocturia.  Denies any significant leakage.  Denies any blood in urine.  Denies any significant weight loss.  Denies any new onset bone pain.    No other urinary complaints at this time.       Past Medical History:   Past Medical History:   Diagnosis Date    Hypertension     Prostate cancer        Past Surgical Historical:   Past Surgical History:   Procedure Laterality Date    ANKLE FRACTURE SURGERY Left     CARPAL TUNNEL RELEASE Left     PROSTATECTOMY  2020    remove of cyst of back of head          Medications:   Medication List with Changes/Refills   Current Medications    HYDROCHLOROTHIAZIDE (MICROZIDE) 12.5 MG CAPSULE        ROSUVASTATIN (CRESTOR) 10 MG TABLET    Take 10 mg by mouth once daily.        Past Social History:   Social History     Socioeconomic History    Marital status: Single   Tobacco Use    Smoking status: Every Day     Packs/day: 0.50     Types: Cigarettes    Smokeless tobacco: Former   Substance and Sexual Activity    Alcohol use: Yes     Comment: very rarely    Drug use: Yes     Frequency: 7.0 times per week     Types: Marijuana     Comment: daily       Allergies: Review of patient's allergies indicates:  No Known Allergies     Family History:   Family History   Problem Relation Age of Onset    Cancer Father     Stomach cancer Father     Heart disease Father     Diabetes Mother     Heart disease Mother     Kidney disease Mother         Review of Systems:  Review of  Systems   Constitutional:  Negative for activity change and appetite change.   HENT:  Negative for congestion and dental problem.    Respiratory:  Negative for chest tightness and shortness of breath.    Cardiovascular:  Negative for chest pain.   Gastrointestinal:  Negative for abdominal distention and abdominal pain.   Genitourinary:  Negative for decreased urine volume, difficulty urinating, dysuria, enuresis, flank pain, frequency, genital sores, hematuria, penile discharge, penile pain, penile swelling, scrotal swelling, testicular pain and urgency.   Musculoskeletal:  Negative for back pain and neck pain.   Neurological:  Negative for dizziness.   Hematological:  Negative for adenopathy.   Psychiatric/Behavioral:  Negative for agitation, behavioral problems and confusion.      Physical Exam:  Physical Exam  Vitals and nursing note reviewed.   Constitutional:       Appearance: He is well-developed.   HENT:      Head: Normocephalic.   Cardiovascular:      Rate and Rhythm: Normal rate and regular rhythm.      Heart sounds: Normal heart sounds.   Pulmonary:      Effort: Pulmonary effort is normal.      Breath sounds: Normal breath sounds.   Abdominal:      General: Bowel sounds are normal.      Palpations: Abdomen is soft.   Skin:     General: Skin is warm and dry.   Neurological:      Mental Status: He is alert and oriented to person, place, and time.       Assessment/Plan:   Prostate cancer:  Will check patient's PSA.  We will notify him of the results.    PSAs have been undetectable.      Plan follow-up in 4 months, sooner if needed.  Problem List Items Addressed This Visit          Oncology    Prostate cancer - Primary    Overview     Radical prostatectomy February 2020  Prostate measured 100 g.      January of 2006 he had a repeat PSA and went up to 8.5 ng/mL.  He underwent another biopsy in February 10, 2016 which showed [place] 3 + 3 equals 6 in 1 core 0.3 mm focus 3% of the core.  He also had atypical  small acinar proliferation in 1 core.           Relevant Orders    Prostate Specific Antigen, Diagnostic

## 2023-01-10 LAB — PSA, DIAGNOSTIC: <0.014 NG/ML (ref 0–4)

## 2023-01-30 NOTE — PROGRESS NOTES
Subjective:       Patient ID: Elton Monge is a 62 y.o. male.    Chief Complaint: Prostate Cancer      HPI: 62-year-old male, established patient, presents for 4 month visit.    Patient has history of prostate cancer with radical prostatectomy in February 2020.    Patient's PSAs have been undetectable.      Patient states he has rare occasions of leakage.  Primarily if he does not void when initially needing 2.    He denies any pain or burning urination.  Denies any odor urine.  Denies any fever body aches.  Denies any blood in urine.  Denies any significant weight loss.  Denies any new onset bone pain.    No other urinary complaints at this time.       Past Medical History:   Past Medical History:   Diagnosis Date    Hypertension     Prostate cancer        Past Surgical Historical:   Past Surgical History:   Procedure Laterality Date    ANKLE FRACTURE SURGERY Left     CARPAL TUNNEL RELEASE Left     PROSTATECTOMY  2020    remove of cyst of back of head          Medications:   Medication List with Changes/Refills   Current Medications    HYDROCHLOROTHIAZIDE (MICROZIDE) 12.5 MG CAPSULE        ROSUVASTATIN (CRESTOR) 10 MG TABLET    Take 10 mg by mouth once daily.   Discontinued Medications    ROSUVASTATIN (CRESTOR) 5 MG TABLET    TAKE 1 TABLET BY MOUTH EVERY OTHER DAY thank youmike -)        Past Social History:   Social History     Socioeconomic History    Marital status: Single   Tobacco Use    Smoking status: Every Day     Packs/day: 0.50     Types: Cigarettes    Smokeless tobacco: Former   Substance and Sexual Activity    Alcohol use: Yes     Comment: very rarely    Drug use: Yes     Frequency: 7.0 times per week     Types: Marijuana     Comment: daily       Allergies: Review of patient's allergies indicates:  No Known Allergies     Family History:   Family History   Problem Relation Age of Onset    Cancer Father     Stomach cancer Father     Heart disease Father     Diabetes Mother     Heart disease Mother      Kidney disease Mother         Review of Systems:  Review of Systems   Constitutional:  Negative for activity change and appetite change.   HENT:  Negative for congestion and dental problem.    Respiratory:  Negative for chest tightness and shortness of breath.    Cardiovascular:  Negative for chest pain.   Gastrointestinal:  Negative for abdominal distention and abdominal pain.   Genitourinary:  Negative for decreased urine volume, difficulty urinating, dysuria, enuresis, flank pain, frequency, genital sores, hematuria, penile discharge, penile pain, penile swelling, scrotal swelling, testicular pain and urgency.   Musculoskeletal:  Negative for back pain and neck pain.   Neurological:  Negative for dizziness.   Hematological:  Negative for adenopathy.   Psychiatric/Behavioral:  Negative for agitation, behavioral problems and confusion.      Physical Exam:  Physical Exam  Vitals and nursing note reviewed.   Constitutional:       Appearance: He is well-developed.   HENT:      Head: Normocephalic.   Cardiovascular:      Rate and Rhythm: Normal rate and regular rhythm.      Heart sounds: Normal heart sounds.   Pulmonary:      Effort: Pulmonary effort is normal.      Breath sounds: Normal breath sounds.   Abdominal:      General: Bowel sounds are normal.      Palpations: Abdomen is soft.   Skin:     General: Skin is warm and dry.   Neurological:      Mental Status: He is alert and oriented to person, place, and time.       Assessment/Plan:   Prostate cancer:  Will check the patient's PSA.  We will notify him of the results.      Will plan follow-up in 4 months, sooner if needed.  Problem List Items Addressed This Visit          Oncology    Prostate cancer - Primary    Overview     Radical prostatectomy February 2020  Prostate measured 100 g.      January of 2006 he had a repeat PSA and went up to 8.5 ng/mL.  He underwent another biopsy in February 10, 2016 which showed [place] 3 + 3 equals 6 in 1 core 0.3 mm focus 3% of  the core.  He also had atypical small acinar proliferation in 1 core.           Relevant Orders    Prostate Specific Antigen, Diagnostic                 Dimensions-X Axis In Cm: 0.5

## 2023-05-12 ENCOUNTER — OFFICE VISIT (OUTPATIENT)
Dept: UROLOGY | Facility: CLINIC | Age: 63
End: 2023-05-12
Payer: COMMERCIAL

## 2023-05-12 VITALS — BODY MASS INDEX: 36.4 KG/M2 | HEIGHT: 71 IN | RESPIRATION RATE: 20 BRPM | WEIGHT: 260 LBS

## 2023-05-12 DIAGNOSIS — C61 PROSTATE CANCER: Primary | ICD-10-CM

## 2023-05-12 LAB — PSA, DIAGNOSTIC: < 0.01 NG/ML (ref 0.1–4)

## 2023-05-12 PROCEDURE — 1159F MED LIST DOCD IN RCRD: CPT | Mod: CPTII,S$GLB,, | Performed by: NURSE PRACTITIONER

## 2023-05-12 PROCEDURE — 99213 PR OFFICE/OUTPT VISIT, EST, LEVL III, 20-29 MIN: ICD-10-PCS | Mod: S$GLB,,, | Performed by: NURSE PRACTITIONER

## 2023-05-12 PROCEDURE — 1159F PR MEDICATION LIST DOCUMENTED IN MEDICAL RECORD: ICD-10-PCS | Mod: CPTII,S$GLB,, | Performed by: NURSE PRACTITIONER

## 2023-05-12 PROCEDURE — 1160F PR REVIEW ALL MEDS BY PRESCRIBER/CLIN PHARMACIST DOCUMENTED: ICD-10-PCS | Mod: CPTII,S$GLB,, | Performed by: NURSE PRACTITIONER

## 2023-05-12 PROCEDURE — 1160F RVW MEDS BY RX/DR IN RCRD: CPT | Mod: CPTII,S$GLB,, | Performed by: NURSE PRACTITIONER

## 2023-05-12 PROCEDURE — 3008F BODY MASS INDEX DOCD: CPT | Mod: CPTII,S$GLB,, | Performed by: NURSE PRACTITIONER

## 2023-05-12 PROCEDURE — 3008F PR BODY MASS INDEX (BMI) DOCUMENTED: ICD-10-PCS | Mod: CPTII,S$GLB,, | Performed by: NURSE PRACTITIONER

## 2023-05-12 PROCEDURE — 99213 OFFICE O/P EST LOW 20 MIN: CPT | Mod: S$GLB,,, | Performed by: NURSE PRACTITIONER

## 2023-05-12 NOTE — PROGRESS NOTES
Subjective:       Patient ID: Elton Monge is a 62 y.o. male.    Chief Complaint: 4 mth f/u and Prostate Cancer      HPI: 62-year-old male, established patient, presents for 4 month visit.    Patient has history of prostate cancer with radical prostatectomy in February of 2020.  Patient's PSAs have been undetectable.      Patient denies any pain burning urination.  Denies any difficulty voiding.  States he has a good stream from start to finish.  Denies any significant frequency, urgency, or nocturia.  Denies any unexpected weight loss.  Denies any new onset bone pain.    No other urinary complaints at this time.       Past Medical History:   Past Medical History:   Diagnosis Date    Hypertension     Prostate cancer        Past Surgical Historical:   Past Surgical History:   Procedure Laterality Date    ANKLE FRACTURE SURGERY Left     CARPAL TUNNEL RELEASE Left     PROSTATECTOMY  2020    remove of cyst of back of head          Medications:   Medication List with Changes/Refills   Current Medications    HYDROCHLOROTHIAZIDE (MICROZIDE) 12.5 MG CAPSULE        ROSUVASTATIN (CRESTOR) 10 MG TABLET    Take 10 mg by mouth once daily.        Past Social History:   Social History     Socioeconomic History    Marital status: Single   Tobacco Use    Smoking status: Every Day     Packs/day: 0.50     Types: Cigarettes    Smokeless tobacco: Former   Substance and Sexual Activity    Alcohol use: Yes     Comment: very rarely    Drug use: Yes     Frequency: 7.0 times per week     Types: Marijuana     Comment: daily       Allergies: Review of patient's allergies indicates:  No Known Allergies     Family History:   Family History   Problem Relation Age of Onset    Cancer Father     Stomach cancer Father     Heart disease Father     Diabetes Mother     Heart disease Mother     Kidney disease Mother         Review of Systems:  Review of Systems   Constitutional:  Negative for activity change and appetite change.   HENT:  Negative for  congestion and dental problem.    Respiratory:  Negative for chest tightness and shortness of breath.    Cardiovascular:  Negative for chest pain.   Gastrointestinal:  Negative for abdominal distention and abdominal pain.   Genitourinary:  Negative for decreased urine volume, difficulty urinating, dysuria, enuresis, flank pain, frequency, genital sores, hematuria, penile discharge, penile pain, penile swelling, scrotal swelling, testicular pain and urgency.   Musculoskeletal:  Negative for back pain and neck pain.   Neurological:  Negative for dizziness.   Hematological:  Negative for adenopathy.   Psychiatric/Behavioral:  Negative for agitation, behavioral problems and confusion.      Physical Exam:  Physical Exam  Vitals and nursing note reviewed.   Constitutional:       Appearance: He is well-developed.   HENT:      Head: Normocephalic.   Cardiovascular:      Rate and Rhythm: Normal rate and regular rhythm.      Heart sounds: Normal heart sounds.   Pulmonary:      Effort: Pulmonary effort is normal.      Breath sounds: Normal breath sounds.   Abdominal:      General: Bowel sounds are normal.      Palpations: Abdomen is soft.   Skin:     General: Skin is warm and dry.   Neurological:      Mental Status: He is alert and oriented to person, place, and time.       Assessment/Plan:   Prostate cancer:  Patient's PSA.  We will notify him of the results.    PSAs have been undetectable.      Will plan follow-up in 6 months, sooner if needed.  Problem List Items Addressed This Visit          Oncology    Prostate cancer - Primary    Overview     Radical prostatectomy February 2020  Prostate measured 100 g.      January of 2006 he had a repeat PSA and went up to 8.5 ng/mL.  He underwent another biopsy in February 10, 2016 which showed [place] 3 + 3 equals 6 in 1 core 0.3 mm focus 3% of the core.  He also had atypical small acinar proliferation in 1 core.           Relevant Orders    Prostate Specific Antigen, Diagnostic

## 2023-05-15 ENCOUNTER — TELEPHONE (OUTPATIENT)
Dept: UROLOGY | Facility: CLINIC | Age: 63
End: 2023-05-15
Payer: COMMERCIAL

## 2023-11-14 ENCOUNTER — OFFICE VISIT (OUTPATIENT)
Dept: UROLOGY | Facility: CLINIC | Age: 63
End: 2023-11-14
Payer: COMMERCIAL

## 2023-11-14 DIAGNOSIS — C61 PROSTATE CANCER: Primary | ICD-10-CM

## 2023-11-14 DIAGNOSIS — N52.9 ERECTILE DYSFUNCTION, UNSPECIFIED ERECTILE DYSFUNCTION TYPE: ICD-10-CM

## 2023-11-14 LAB
BILIRUBIN, UA POC OHS: NEGATIVE
BLOOD, UA POC OHS: NEGATIVE
CLARITY, UA POC OHS: CLEAR
COLOR, UA POC OHS: YELLOW
GLUCOSE, UA POC OHS: NEGATIVE
KETONES, UA POC OHS: NEGATIVE
LEUKOCYTES, UA POC OHS: NEGATIVE
NITRITE, UA POC OHS: NEGATIVE
PH, UA POC OHS: 7
PROTEIN, UA POC OHS: NEGATIVE
PSA, DIAGNOSTIC: < 0.01 NG/ML (ref 0.1–4)
SPECIFIC GRAVITY, UA POC OHS: 1.01
UROBILINOGEN, UA POC OHS: 0.2

## 2023-11-14 PROCEDURE — 81003 POCT URINALYSIS(INSTRUMENT): ICD-10-PCS | Mod: QW,S$GLB,, | Performed by: NURSE PRACTITIONER

## 2023-11-14 PROCEDURE — 99214 PR OFFICE/OUTPT VISIT, EST, LEVL IV, 30-39 MIN: ICD-10-PCS | Mod: S$GLB,,, | Performed by: NURSE PRACTITIONER

## 2023-11-14 PROCEDURE — 1160F RVW MEDS BY RX/DR IN RCRD: CPT | Mod: CPTII,S$GLB,, | Performed by: NURSE PRACTITIONER

## 2023-11-14 PROCEDURE — 1159F PR MEDICATION LIST DOCUMENTED IN MEDICAL RECORD: ICD-10-PCS | Mod: CPTII,S$GLB,, | Performed by: NURSE PRACTITIONER

## 2023-11-14 PROCEDURE — 81003 URINALYSIS AUTO W/O SCOPE: CPT | Mod: QW,S$GLB,, | Performed by: NURSE PRACTITIONER

## 2023-11-14 PROCEDURE — 1159F MED LIST DOCD IN RCRD: CPT | Mod: CPTII,S$GLB,, | Performed by: NURSE PRACTITIONER

## 2023-11-14 PROCEDURE — 99214 OFFICE O/P EST MOD 30 MIN: CPT | Mod: S$GLB,,, | Performed by: NURSE PRACTITIONER

## 2023-11-14 PROCEDURE — 1160F PR REVIEW ALL MEDS BY PRESCRIBER/CLIN PHARMACIST DOCUMENTED: ICD-10-PCS | Mod: CPTII,S$GLB,, | Performed by: NURSE PRACTITIONER

## 2023-11-14 RX ORDER — SILDENAFIL CITRATE 20 MG/1
20 TABLET ORAL DAILY PRN
Qty: 50 TABLET | Refills: 11 | Status: SHIPPED | OUTPATIENT
Start: 2023-11-14 | End: 2024-11-13

## 2023-11-14 NOTE — PROGRESS NOTES
Subjective:       Patient ID: Elton Monge is a 63 y.o. male.    Chief Complaint: Prostate Cancer      HPI: 63-year-old male, established patient, presents for 6 month visit.    Patient has history of prostate cancer with radical prostatectomy in 2020.    Patient's PSAs have been undetectable.    Patient is complain of some erectile dysfunction.    He is interested in trying some medications.    Denies any pain or burning urination.  Denies any odor urine denies any fever body aches.  Denies any unexpected weight loss.    No other complaints at this time.         Past Medical History:   Past Medical History:   Diagnosis Date    Hypertension     Prostate cancer        Past Surgical Historical:   Past Surgical History:   Procedure Laterality Date    ANKLE FRACTURE SURGERY Left     CARPAL TUNNEL RELEASE Left     PROSTATECTOMY  2020    remove of cyst of back of head          Medications:   Medication List with Changes/Refills   New Medications    SILDENAFIL (REVATIO) 20 MG TAB    Take 1 tablet (20 mg total) by mouth daily as needed (1-5 tabs PO daily as needed.  No more than 5 tabs in 24 hour periods.).   Current Medications    HYDROCHLOROTHIAZIDE (MICROZIDE) 12.5 MG CAPSULE        ROSUVASTATIN (CRESTOR) 10 MG TABLET    Take 10 mg by mouth once daily.        Past Social History:   Social History     Socioeconomic History    Marital status: Single   Tobacco Use    Smoking status: Every Day     Current packs/day: 0.50     Types: Cigarettes    Smokeless tobacco: Former   Substance and Sexual Activity    Alcohol use: Yes     Comment: very rarely    Drug use: Yes     Frequency: 7.0 times per week     Types: Marijuana     Comment: daily       Allergies: Review of patient's allergies indicates:  No Known Allergies     Family History:   Family History   Problem Relation Age of Onset    Cancer Father     Stomach cancer Father     Heart disease Father     Diabetes Mother     Heart disease Mother     Kidney disease Mother          Review of Systems:  Review of Systems   Constitutional:  Negative for activity change and appetite change.   HENT:  Negative for congestion and dental problem.    Eyes:  Negative for visual disturbance.   Respiratory:  Negative for chest tightness and shortness of breath.    Cardiovascular:  Negative for chest pain.   Gastrointestinal:  Negative for abdominal distention and abdominal pain.   Genitourinary:  Negative for decreased urine volume, difficulty urinating, dysuria, enuresis, flank pain, frequency, genital sores, hematuria, penile discharge, penile pain, penile swelling, scrotal swelling, testicular pain and urgency.   Musculoskeletal:  Negative for back pain and neck pain.   Skin:  Negative for color change.   Neurological:  Negative for dizziness.   Hematological:  Negative for adenopathy.   Psychiatric/Behavioral:  Negative for agitation, behavioral problems and confusion.        Physical Exam:  Physical Exam  Vitals and nursing note reviewed.   Constitutional:       Appearance: He is well-developed.   HENT:      Head: Normocephalic.   Eyes:      Pupils: Pupils are equal, round, and reactive to light.   Cardiovascular:      Rate and Rhythm: Normal rate and regular rhythm.      Heart sounds: Normal heart sounds.   Pulmonary:      Effort: Pulmonary effort is normal.      Breath sounds: Normal breath sounds.   Abdominal:      General: Bowel sounds are normal.      Palpations: Abdomen is soft.   Musculoskeletal:         General: Normal range of motion.      Cervical back: Normal range of motion and neck supple.   Skin:     General: Skin is warm and dry.   Neurological:      Mental Status: He is alert and oriented to person, place, and time.   Psychiatric:         Behavior: Behavior normal.       Urinalysis:  Normal    Assessment/Plan:   1. Prostate cancer:  Check the patient's PSA.  We will notify him of the results.    2. Erectile dysfunction:  Discussed sildenafil and tadalafil.    Patient would like to  try sildenafil.  Patient provided prescription for sildenafil 20 mg as needed.  He can use 1-5 tabs as needed.    Did discuss risk.  Patient stated understanding.    Patient states that he may hold onto the prescription to determine if he truly needs the medication on hand.  Patient will notify us of the results.    Follow-up in 6 months, sooner if needed.  Problem List Items Addressed This Visit          Oncology    Prostate cancer - Primary    Overview     Radical prostatectomy February 2020  Prostate measured 100 g.      January of 2006 he had a repeat PSA and went up to 8.5 ng/mL.  He underwent another biopsy in February 10, 2016 which showed Rashad 3 + 3 equals 6 in 1 core 0.3 mm focus 3% of the core.  He also had atypical small acinar proliferation in 1 core.           Relevant Orders    Prostate Specific Antigen, Diagnostic    POCT Urinalysis(Instrument) (Completed)     Other Visit Diagnoses       Erectile dysfunction, unspecified erectile dysfunction type        Relevant Medications    sildenafil (REVATIO) 20 mg Tab

## 2024-05-13 ENCOUNTER — TELEPHONE (OUTPATIENT)
Dept: UROLOGY | Facility: CLINIC | Age: 64
End: 2024-05-13
Payer: COMMERCIAL

## 2024-05-13 NOTE — TELEPHONE ENCOUNTER
Pt called to reschedule jonathan. Micky is booked up so patient was rescheduled with [facility]. Pt was ok with this.----- Message from Jeanette Joshi sent at 5/13/2024 10:45 AM CDT -----  Contact: Ray  Pt is calling in regards to getting next available appt or keep tomorrow's jonathan 5/14/24 between 10 and 11 am. Pt can be reached at [phone].        Thanks  MELLO

## 2024-05-23 ENCOUNTER — OFFICE VISIT (OUTPATIENT)
Dept: UROLOGY | Facility: CLINIC | Age: 64
End: 2024-05-23
Payer: COMMERCIAL

## 2024-05-23 VITALS
HEIGHT: 71 IN | BODY MASS INDEX: 36.4 KG/M2 | OXYGEN SATURATION: 96 % | SYSTOLIC BLOOD PRESSURE: 136 MMHG | DIASTOLIC BLOOD PRESSURE: 78 MMHG | HEART RATE: 62 BPM | WEIGHT: 260 LBS

## 2024-05-23 DIAGNOSIS — C61 PROSTATE CANCER: Primary | ICD-10-CM

## 2024-05-23 LAB
BILIRUBIN, UA POC OHS: NEGATIVE
BLOOD, UA POC OHS: NEGATIVE
CLARITY, UA POC OHS: CLEAR
COLOR, UA POC OHS: YELLOW
GLUCOSE, UA POC OHS: NEGATIVE
KETONES, UA POC OHS: NEGATIVE
LEUKOCYTES, UA POC OHS: NEGATIVE
NITRITE, UA POC OHS: NEGATIVE
PH, UA POC OHS: 5.5
PROTEIN, UA POC OHS: NEGATIVE
PSA, DIAGNOSTIC: < 0.01 NG/ML (ref 0.1–4)
SPECIFIC GRAVITY, UA POC OHS: >=1.03
UROBILINOGEN, UA POC OHS: 0.2

## 2024-05-23 PROCEDURE — 1160F RVW MEDS BY RX/DR IN RCRD: CPT | Mod: CPTII,S$GLB,,

## 2024-05-23 PROCEDURE — 1159F MED LIST DOCD IN RCRD: CPT | Mod: CPTII,S$GLB,,

## 2024-05-23 PROCEDURE — 3078F DIAST BP <80 MM HG: CPT | Mod: CPTII,S$GLB,,

## 2024-05-23 PROCEDURE — 3075F SYST BP GE 130 - 139MM HG: CPT | Mod: CPTII,S$GLB,,

## 2024-05-23 PROCEDURE — 81003 URINALYSIS AUTO W/O SCOPE: CPT | Mod: QW,S$GLB,,

## 2024-05-23 PROCEDURE — 99214 OFFICE O/P EST MOD 30 MIN: CPT | Mod: S$GLB,,,

## 2024-05-23 PROCEDURE — 3008F BODY MASS INDEX DOCD: CPT | Mod: CPTII,S$GLB,,

## 2024-05-23 RX ORDER — CELECOXIB 200 MG/1
CAPSULE ORAL
COMMUNITY
Start: 2024-05-21

## 2024-05-23 RX ORDER — ROSUVASTATIN CALCIUM 10 MG/1
1 TABLET, COATED ORAL DAILY
COMMUNITY
Start: 2023-05-02

## 2024-05-23 NOTE — PROGRESS NOTES
Subjective:       Patient ID: Elton Monge is a 63 y.o. male.    Chief Complaint: Elevated PSA      HPI: 63-year-old male established patient presents today for six-month PSA. Patient has history of prostate cancer with radical prostatectomy in 2020.  Patient's PSAs have been undetectable.  At last visit patient was started on sildenafil for his erectile dysfunction however patient reports today he has no longer taking that medication as he is no longer in a relationship.  Today patient denies dysuria, frequency, urgency, nocturia, urinary leakage, bone/pelvic pain or unexplained/unintentional weight loss/gain.         Past Medical History:   Past Medical History:   Diagnosis Date    Hypertension     Prostate cancer        Past Surgical Historical:   Past Surgical History:   Procedure Laterality Date    ANKLE FRACTURE SURGERY Left     CARPAL TUNNEL RELEASE Left     PROSTATECTOMY  2020    remove of cyst of back of head          Medications:   Medication List with Changes/Refills   Current Medications    CELECOXIB (CELEBREX) 200 MG CAPSULE        HYDROCHLOROTHIAZIDE (MICROZIDE) 12.5 MG CAPSULE        ROSUVASTATIN (CRESTOR) 10 MG TABLET    Take 10 mg by mouth once daily.    ROSUVASTATIN (CRESTOR) 10 MG TABLET    Take 1 tablet by mouth once daily.    SILDENAFIL (REVATIO) 20 MG TAB    Take 1 tablet (20 mg total) by mouth daily as needed (1-5 tabs PO daily as needed.  No more than 5 tabs in 24 hour periods.).        Past Social History:   Social History     Socioeconomic History    Marital status: Single   Tobacco Use    Smoking status: Every Day     Current packs/day: 0.50     Types: Cigarettes    Smokeless tobacco: Former   Substance and Sexual Activity    Alcohol use: Yes     Comment: very rarely    Drug use: Yes     Frequency: 7.0 times per week     Types: Marijuana     Comment: daily       Allergies: Review of patient's allergies indicates:  No Known Allergies     Family History:   Family History   Problem Relation  Name Age of Onset    Cancer Father      Stomach cancer Father      Heart disease Father      Diabetes Mother      Heart disease Mother      Kidney disease Mother          Review of Systems:  Review of Systems   Constitutional: Negative.    HENT: Negative.     Eyes: Negative.    Respiratory: Negative.     Cardiovascular: Negative.    Gastrointestinal: Negative.    Endocrine: Negative.    Genitourinary: Negative.    Musculoskeletal: Negative.    Skin: Negative.    Allergic/Immunologic: Negative.    Neurological: Negative.    Hematological: Negative.    Psychiatric/Behavioral: Negative.       Physical Exam:  Physical Exam  Constitutional:       Appearance: Normal appearance.   Cardiovascular:      Rate and Rhythm: Normal rate.   Pulmonary:      Effort: Pulmonary effort is normal.   Abdominal:      General: Bowel sounds are normal.      Palpations: Abdomen is soft.   Genitourinary:     Penis: Normal.    Neurological:      Mental Status: He is alert and oriented to person, place, and time.   Urinalysis: Negative  Assessment/Plan:     Prostate cancer: We will draw patient's PSA today and call him with results.  Patient is 4 years in remission after his radical prostatectomy and has had consistent undetectable PSA levels.  At this time if his current PSA remains undetectable he can begin yearly PSA monitoring.    Follow up in 1 year or p.r.n.  Problem List Items Addressed This Visit          Cardiac/Vascular    Elevated blood-pressure reading without diagnosis of hypertension - Primary

## 2024-05-24 ENCOUNTER — TELEPHONE (OUTPATIENT)
Dept: UROLOGY | Facility: CLINIC | Age: 64
End: 2024-05-24
Payer: COMMERCIAL

## 2024-05-24 NOTE — TELEPHONE ENCOUNTER
Spoke with pt and informed of results.  ----- Message from Kasie Loera NP sent at 5/24/2024  7:48 AM CDT -----  Please call and inform patient his PSA results remain undetectable.  Follow up as previously discussed

## 2025-05-23 ENCOUNTER — OFFICE VISIT (OUTPATIENT)
Dept: UROLOGY | Facility: CLINIC | Age: 65
End: 2025-05-23
Payer: MEDICARE

## 2025-05-23 VITALS
WEIGHT: 260 LBS | BODY MASS INDEX: 36.4 KG/M2 | DIASTOLIC BLOOD PRESSURE: 94 MMHG | HEIGHT: 71 IN | SYSTOLIC BLOOD PRESSURE: 144 MMHG | HEART RATE: 78 BPM

## 2025-05-23 DIAGNOSIS — N52.31 ERECTILE DYSFUNCTION AFTER RADICAL PROSTATECTOMY: ICD-10-CM

## 2025-05-23 DIAGNOSIS — C61 PROSTATE CANCER: Primary | ICD-10-CM

## 2025-05-23 LAB
BILIRUBIN, UA POC OHS: NEGATIVE
BLOOD, UA POC OHS: NEGATIVE
CLARITY, UA POC OHS: CLEAR
COLOR, UA POC OHS: YELLOW
GLUCOSE, UA POC OHS: NEGATIVE
KETONES, UA POC OHS: NEGATIVE
LEUKOCYTES, UA POC OHS: NEGATIVE
NITRITE, UA POC OHS: NEGATIVE
PH, UA POC OHS: 7
PROTEIN, UA POC OHS: NEGATIVE
PSA, DIAGNOSTIC: <0.014 NG/ML (ref 0–4)
SPECIFIC GRAVITY, UA POC OHS: 1.02
UROBILINOGEN, UA POC OHS: 0.2

## 2025-05-23 NOTE — PROGRESS NOTES
Subjective:       Patient ID: Elton Monge is a 64 y.o. male.    Chief Complaint: Prostate Cancer      HPI: 64-year-old male, established patient, presents for yearly visit.    Patient has history of prostate cancer with a radical prostatectomy in February of 2020.    States he is doing well.  Denies any pain or burning urination.  Denies any difficulty voiding.  Denies any significant frequency, urgency, or nocturia.  Denies any blood in urine.  Denies any odor to the urine denies any fever or body aches.  Denies any unexpected weight loss.  Denies any new onset bone pain.    Patient has history of erectile dysfunction secondary to radical prostatectomy.    He is currently on sildenafil.  Patient states he is not really using the medication at this time.  States he has medication on hand.    No other urinary complaints at this time.         Past Medical History:   Past Medical History:   Diagnosis Date    Hypertension     Prostate cancer        Past Surgical Historical:   Past Surgical History:   Procedure Laterality Date    ANKLE FRACTURE SURGERY Left     CARPAL TUNNEL RELEASE Left     PROSTATECTOMY  2020    remove of cyst of back of head          Medications:   Medication List with Changes/Refills   Current Medications    CELECOXIB (CELEBREX) 200 MG CAPSULE        HYDROCHLOROTHIAZIDE (MICROZIDE) 12.5 MG CAPSULE        ROSUVASTATIN (CRESTOR) 10 MG TABLET    Take 10 mg by mouth once daily.    ROSUVASTATIN (CRESTOR) 10 MG TABLET    Take 1 tablet by mouth once daily.    SILDENAFIL (REVATIO) 20 MG TAB    Take 1 tablet (20 mg total) by mouth daily as needed (1-5 tabs PO daily as needed.  No more than 5 tabs in 24 hour periods.).        Past Social History: Social History[1]    Allergies: Review of patient's allergies indicates:  No Known Allergies     Family History:   Family History   Problem Relation Name Age of Onset    Cancer Father      Stomach cancer Father      Heart disease Father      Diabetes Mother       Heart disease Mother      Kidney disease Mother          Review of Systems:  Review of Systems   Constitutional:  Negative for activity change and appetite change.   HENT:  Negative for congestion and dental problem.    Respiratory:  Negative for chest tightness and shortness of breath.    Cardiovascular:  Negative for chest pain.   Gastrointestinal:  Negative for abdominal distention and abdominal pain.   Genitourinary:  Negative for decreased urine volume, difficulty urinating, dysuria, enuresis, flank pain, frequency, genital sores, hematuria, penile discharge, penile pain, penile swelling, scrotal swelling, testicular pain and urgency.   Musculoskeletal:  Negative for back pain and neck pain.   Neurological:  Negative for dizziness.   Hematological:  Negative for adenopathy.   Psychiatric/Behavioral:  Negative for agitation, behavioral problems and confusion.        Physical Exam:  Physical Exam  Vitals and nursing note reviewed.   Constitutional:       Appearance: He is well-developed.   HENT:      Head: Normocephalic.   Cardiovascular:      Rate and Rhythm: Normal rate and regular rhythm.      Heart sounds: Normal heart sounds.   Pulmonary:      Effort: Pulmonary effort is normal.      Breath sounds: Normal breath sounds.   Abdominal:      General: Bowel sounds are normal.      Palpations: Abdomen is soft.   Skin:     General: Skin is warm and dry.   Neurological:      Mental Status: He is alert and oriented to person, place, and time.       Urinalysis: Normal    Assessment/Plan:   1. Prostate cancer: Check the patient's PSA.  We will notify him of the results.      2. Erectile dysfunction: Patient has sildenafil on hand.      Follow-up 1 year, sooner if needed  Problem List Items Addressed This Visit       Prostate cancer - Primary    Overview   Radical prostatectomy February 2020  Prostate measured 100 g.      January of 2006 he had a repeat PSA and went up to 8.5 ng/mL.  He underwent another biopsy in  February 10, 2016 which showed Col[place] 3 + 3 equals 6 in 1 core 0.3 mm focus 3% of the core.  He also had atypical small acinar proliferation in 1 core.           Relevant Orders    Prostate Specific Antigen, Diagnostic    POCT Urinalysis(Instrument)     Other Visit Diagnoses         Erectile dysfunction after radical prostatectomy                          [1]   Social History  Socioeconomic History    Marital status: Single   Tobacco Use    Smoking status: Every Day     Current packs/day: 0.50     Types: Cigarettes    Smokeless tobacco: Former   Substance and Sexual Activity    Alcohol use: Yes     Comment: very rarely    Drug use: Yes     Frequency: 7.0 times per week     Types: Marijuana     Comment: daily

## 2025-05-27 ENCOUNTER — RESULTS FOLLOW-UP (OUTPATIENT)
Dept: UROLOGY | Facility: CLINIC | Age: 65
End: 2025-05-27

## 2025-05-27 ENCOUNTER — TELEPHONE (OUTPATIENT)
Dept: UROLOGY | Facility: CLINIC | Age: 65
End: 2025-05-27
Payer: MEDICARE

## 2025-05-27 NOTE — TELEPHONE ENCOUNTER
Patient was called and informed of PSA results as stated below. Patient verbalized understanding.     ----- Message from Micky Stephen NP sent at 5/27/2025  9:49 AM CDT -----  PSA is undetectable  ----- Message -----  From: Debora Whitten  Sent: 5/23/2025  11:28 AM CDT  To: Micky Stephen NP